# Patient Record
(demographics unavailable — no encounter records)

---

## 2025-05-24 NOTE — HISTORY OF PRESENT ILLNESS
[de-identified] : 69 years old female came to office to establish her care. Pt has past medical history of diabetes, hypertension, hyperlipidemia, and followed with her previous physician in 1/25. Pt has following complaints. 1, leg tingling sensations on and off  2, skin itching on her body on and off, no rash 3, right foot pain when she walked longer distances, never saw podiatrist before.  4, h/o hemorrhage in left eye, s/p surgery, loss some vision after surgery in 4-5 years ago, no follow up after that.  5, pt hasn't injecting novolog because sometimes her morning glucose around 140. Pt also complained weight gain with insulin. Pt refused GLP-1 agonist, afraid of side effects. Pt only inject tresiba 5 units at bedtime, also taking janumet and glipizide.

## 2025-05-24 NOTE — REVIEW OF SYSTEMS
[Negative] : Heme/Lymph [Itching] : Itching [Skin Rash] : no skin rash [de-identified] : Tingling sensation of right leg and right foot pain.

## 2025-05-24 NOTE — HEALTH RISK ASSESSMENT
[Good] : ~his/her~  mood as  good [No] : In the past 12 months have you used drugs other than those required for medical reasons? No [No falls in past year] : Patient reported no falls in the past year [0] : 2) Feeling down, depressed, or hopeless: Not at all (0) [PHQ-2 Negative - No further assessment needed] : PHQ-2 Negative - No further assessment needed [Retinopathy] : Retinopathy [Patient reported mammogram was normal] : Patient reported mammogram was normal [Patient reported bone density results were abnormal] : Patient reported bone density results were abnormal [Patient reported colonoscopy was normal] : Patient reported colonoscopy was normal [With Family] : lives with family [] :  [Fully functional (bathing, dressing, toileting, transferring, walking, feeding)] : Fully functional (bathing, dressing, toileting, transferring, walking, feeding) [Fully functional (using the telephone, shopping, preparing meals, housekeeping, doing laundry, using] : Fully functional and needs no help or supervision to perform IADLs (using the telephone, shopping, preparing meals, housekeeping, doing laundry, using transportation, managing medications and managing finances) [Designated Healthcare Proxy] : Designated healthcare proxy [Relationship: ___] : Relationship: [unfilled] [[id number]] : 0 [EyeExamDate] : 5 years ago [MammogramDate] : 2 years ago [BoneDensityDate] : 2023 [BoneDensityComments] : osteopenia [ColonoscopyDate] : 2020 [Never] : Never

## 2025-05-24 NOTE — HEALTH RISK ASSESSMENT
[Good] : ~his/her~  mood as  good [No] : In the past 12 months have you used drugs other than those required for medical reasons? No [No falls in past year] : Patient reported no falls in the past year [0] : 2) Feeling down, depressed, or hopeless: Not at all (0) [PHQ-2 Negative - No further assessment needed] : PHQ-2 Negative - No further assessment needed [Retinopathy] : Retinopathy [Patient reported mammogram was normal] : Patient reported mammogram was normal [Patient reported bone density results were abnormal] : Patient reported bone density results were abnormal [Patient reported colonoscopy was normal] : Patient reported colonoscopy was normal [With Family] : lives with family [] :  [Fully functional (bathing, dressing, toileting, transferring, walking, feeding)] : Fully functional (bathing, dressing, toileting, transferring, walking, feeding) [Fully functional (using the telephone, shopping, preparing meals, housekeeping, doing laundry, using] : Fully functional and needs no help or supervision to perform IADLs (using the telephone, shopping, preparing meals, housekeeping, doing laundry, using transportation, managing medications and managing finances) [Designated Healthcare Proxy] : Designated healthcare proxy [Relationship: ___] : Relationship: [unfilled] [[id number]] : 0 [EyeExamDate] : 5 years ago [MammogramDate] : 2 years ago [BoneDensityDate] : 2023 [BoneDensityComments] : osteopenia [ColonoscopyDate] : 2020 [Never] : Never

## 2025-05-24 NOTE — PHYSICAL EXAM
Periorbital Skin Units: 50 [No Acute Distress] : no acute distress [Normal Sclera/Conjunctiva] : normal sclera/conjunctiva [PERRL] : pupils equal round and reactive to light [EOMI] : extraocular movements intact [Normal Outer Ear/Nose] : the outer ears and nose were normal in appearance [Normal Oropharynx] : the oropharynx was normal [No JVD] : no jugular venous distention [No Lymphadenopathy] : no lymphadenopathy [Supple] : supple [Thyroid Normal, No Nodules] : the thyroid was normal and there were no nodules present [No Respiratory Distress] : no respiratory distress  [No Accessory Muscle Use] : no accessory muscle use [Clear to Auscultation] : lungs were clear to auscultation bilaterally [Normal Rate] : normal rate  [Regular Rhythm] : with a regular rhythm [Normal S1, S2] : normal S1 and S2 [No Murmur] : no murmur heard [No Varicosities] : no varicosities [Pedal Pulses Present] : the pedal pulses are present [No Edema] : there was no peripheral edema [No Palpable Aorta] : no palpable aorta [No Extremity Clubbing/Cyanosis] : no extremity clubbing/cyanosis [Soft] : abdomen soft [Non Tender] : non-tender [Non-distended] : non-distended [No Masses] : no abdominal mass palpated [No HSM] : no HSM [Normal Bowel Sounds] : normal bowel sounds [Normal Anterior Cervical Nodes] : no anterior cervical lymphadenopathy [No CVA Tenderness] : no CVA  tenderness [No Spinal Tenderness] : no spinal tenderness [No Joint Swelling] : no joint swelling [Grossly Normal Strength/Tone] : grossly normal strength/tone [No Rash] : no rash [Coordination Grossly Intact] : coordination grossly intact [No Focal Deficits] : no focal deficits [Normal Gait] : normal gait [Deep Tendon Reflexes (DTR)] : deep tendon reflexes were 2+ and symmetric [Normal Affect] : the affect was normal [Normal Insight/Judgement] : insight and judgment were intact [de-identified] : Right foot: no swelling, and no tenderness

## 2025-05-24 NOTE — PHYSICAL EXAM
[No Acute Distress] : no acute distress [Normal Sclera/Conjunctiva] : normal sclera/conjunctiva [PERRL] : pupils equal round and reactive to light [EOMI] : extraocular movements intact [Normal Outer Ear/Nose] : the outer ears and nose were normal in appearance [Normal Oropharynx] : the oropharynx was normal [No JVD] : no jugular venous distention [No Lymphadenopathy] : no lymphadenopathy [Supple] : supple [Thyroid Normal, No Nodules] : the thyroid was normal and there were no nodules present [No Respiratory Distress] : no respiratory distress  [No Accessory Muscle Use] : no accessory muscle use [Clear to Auscultation] : lungs were clear to auscultation bilaterally [Normal Rate] : normal rate  [Regular Rhythm] : with a regular rhythm [Normal S1, S2] : normal S1 and S2 [No Murmur] : no murmur heard [No Varicosities] : no varicosities [Pedal Pulses Present] : the pedal pulses are present [No Edema] : there was no peripheral edema [No Palpable Aorta] : no palpable aorta [No Extremity Clubbing/Cyanosis] : no extremity clubbing/cyanosis [Soft] : abdomen soft [Non Tender] : non-tender [Non-distended] : non-distended [No Masses] : no abdominal mass palpated [No HSM] : no HSM [Normal Bowel Sounds] : normal bowel sounds [Normal Anterior Cervical Nodes] : no anterior cervical lymphadenopathy [No CVA Tenderness] : no CVA  tenderness [No Spinal Tenderness] : no spinal tenderness [No Joint Swelling] : no joint swelling [Grossly Normal Strength/Tone] : grossly normal strength/tone [No Rash] : no rash [Coordination Grossly Intact] : coordination grossly intact [No Focal Deficits] : no focal deficits [Normal Gait] : normal gait [Deep Tendon Reflexes (DTR)] : deep tendon reflexes were 2+ and symmetric [Normal Affect] : the affect was normal [Normal Insight/Judgement] : insight and judgment were intact [de-identified] : Right foot: no swelling, and no tenderness

## 2025-05-24 NOTE — HISTORY OF PRESENT ILLNESS
[de-identified] : 69 years old female came to office to establish her care. Pt has past medical history of diabetes, hypertension, hyperlipidemia, and followed with her previous physician in 1/25. Pt has following complaints. 1, leg tingling sensations on and off  2, skin itching on her body on and off, no rash 3, right foot pain when she walked longer distances, never saw podiatrist before.  4, h/o hemorrhage in left eye, s/p surgery, loss some vision after surgery in 4-5 years ago, no follow up after that.  5, pt hasn't injecting novolog because sometimes her morning glucose around 140. Pt also complained weight gain with insulin. Pt refused GLP-1 agonist, afraid of side effects. Pt only inject tresiba 5 units at bedtime, also taking janumet and glipizide.

## 2025-05-24 NOTE — REVIEW OF SYSTEMS
[Negative] : Heme/Lymph [Itching] : Itching [Skin Rash] : no skin rash [de-identified] : Tingling sensation of right leg and right foot pain.

## 2025-06-12 NOTE — HISTORY OF PRESENT ILLNESS
[de-identified] : 69 years old female with past medical history of hypertension, hyperlipidemia, insomnia, and diabetes came back to review her most recent test results. Vitamin B 12 was 1335, K 6.0, fasting glucose 217, HBA1c 9.4, alkaline phosphate 213, and UA positive for trace leukocyte esterase. Reports were reviewed with pt in details. Other blood tests came back wnl.  Pt saw cardiologist yesterday, had repeated blood test for blood K level. Pt got a phone call this morning from cardiologist, serum K wnl. Pt complained right knee pain on and off, requested topical pain medication.

## 2025-06-12 NOTE — HISTORY OF PRESENT ILLNESS
[de-identified] : 69 years old female with past medical history of hypertension, hyperlipidemia, insomnia, and diabetes came back to review her most recent test results. Vitamin B 12 was 1335, K 6.0, fasting glucose 217, HBA1c 9.4, alkaline phosphate 213, and UA positive for trace leukocyte esterase. Reports were reviewed with pt in details. Other blood tests came back wnl.  Pt saw cardiologist yesterday, had repeated blood test for blood K level. Pt got a phone call this morning from cardiologist, serum K wnl. Pt complained right knee pain on and off, requested topical pain medication.